# Patient Record
Sex: FEMALE | Race: WHITE | NOT HISPANIC OR LATINO | Employment: UNEMPLOYED | URBAN - METROPOLITAN AREA
[De-identification: names, ages, dates, MRNs, and addresses within clinical notes are randomized per-mention and may not be internally consistent; named-entity substitution may affect disease eponyms.]

---

## 2024-07-13 ENCOUNTER — HOSPITAL ENCOUNTER (EMERGENCY)
Facility: HOSPITAL | Age: 54
Discharge: HOME | End: 2024-07-14
Attending: INTERNAL MEDICINE
Payer: MEDICAID

## 2024-07-13 DIAGNOSIS — E86.0 DEHYDRATION: ICD-10-CM

## 2024-07-13 DIAGNOSIS — F14.10 COCAINE ABUSE: ICD-10-CM

## 2024-07-13 DIAGNOSIS — K59.00 CONSTIPATION, UNSPECIFIED CONSTIPATION TYPE: Primary | ICD-10-CM

## 2024-07-13 DIAGNOSIS — F12.980: ICD-10-CM

## 2024-07-13 LAB
BACTERIA #/AREA URNS AUTO: ABNORMAL /HPF
BASOPHILS # BLD AUTO: 0.05 X10(3)/MCL
BASOPHILS NFR BLD AUTO: 0.5 %
BILIRUB UR QL STRIP.AUTO: ABNORMAL
CLARITY UR: CLEAR
COLOR UR AUTO: YELLOW
EOSINOPHIL # BLD AUTO: 0.19 X10(3)/MCL (ref 0–0.9)
EOSINOPHIL NFR BLD AUTO: 1.8 %
ERYTHROCYTE [DISTWIDTH] IN BLOOD BY AUTOMATED COUNT: 18.6 % (ref 11.5–17)
GLUCOSE UR QL STRIP: NEGATIVE
HCT VFR BLD AUTO: 50 % (ref 37–47)
HGB BLD-MCNC: 16.6 G/DL (ref 12–16)
HGB UR QL STRIP: NEGATIVE
IMM GRANULOCYTES # BLD AUTO: 0.03 X10(3)/MCL (ref 0–0.04)
IMM GRANULOCYTES NFR BLD AUTO: 0.3 %
KETONES UR QL STRIP: ABNORMAL
LEUKOCYTE ESTERASE UR QL STRIP: NEGATIVE
LYMPHOCYTES # BLD AUTO: 2.45 X10(3)/MCL (ref 0.6–4.6)
LYMPHOCYTES NFR BLD AUTO: 23.4 %
MCH RBC QN AUTO: 25.6 PG (ref 27–31)
MCHC RBC AUTO-ENTMCNC: 33.2 G/DL (ref 33–36)
MCV RBC AUTO: 77.2 FL (ref 80–94)
MONOCYTES # BLD AUTO: 1.09 X10(3)/MCL (ref 0.1–1.3)
MONOCYTES NFR BLD AUTO: 10.4 %
NEUTROPHILS # BLD AUTO: 6.66 X10(3)/MCL (ref 2.1–9.2)
NEUTROPHILS NFR BLD AUTO: 63.6 %
NITRITE UR QL STRIP: NEGATIVE
PH UR STRIP: 6 [PH]
PLATELET # BLD AUTO: 314 X10(3)/MCL (ref 130–400)
PMV BLD AUTO: 11 FL (ref 7.4–10.4)
PROT UR QL STRIP: ABNORMAL
RBC # BLD AUTO: 6.48 X10(6)/MCL (ref 4.2–5.4)
RBC #/AREA URNS AUTO: ABNORMAL /HPF
SP GR UR STRIP.AUTO: 1.02 (ref 1–1.03)
SQUAMOUS #/AREA URNS AUTO: ABNORMAL /HPF
UROBILINOGEN UR STRIP-ACNC: 0.2
WBC # BLD AUTO: 10.47 X10(3)/MCL (ref 4.5–11.5)
WBC #/AREA URNS AUTO: ABNORMAL /HPF

## 2024-07-13 PROCEDURE — 81003 URINALYSIS AUTO W/O SCOPE: CPT | Performed by: INTERNAL MEDICINE

## 2024-07-13 PROCEDURE — 80307 DRUG TEST PRSMV CHEM ANLYZR: CPT | Performed by: INTERNAL MEDICINE

## 2024-07-13 PROCEDURE — 82077 ASSAY SPEC XCP UR&BREATH IA: CPT | Performed by: INTERNAL MEDICINE

## 2024-07-13 PROCEDURE — 85025 COMPLETE CBC W/AUTO DIFF WBC: CPT | Performed by: INTERNAL MEDICINE

## 2024-07-13 PROCEDURE — 83605 ASSAY OF LACTIC ACID: CPT | Performed by: INTERNAL MEDICINE

## 2024-07-13 PROCEDURE — 96361 HYDRATE IV INFUSION ADD-ON: CPT

## 2024-07-13 PROCEDURE — 99284 EMERGENCY DEPT VISIT MOD MDM: CPT | Mod: 25

## 2024-07-13 PROCEDURE — 80053 COMPREHEN METABOLIC PANEL: CPT | Performed by: INTERNAL MEDICINE

## 2024-07-13 PROCEDURE — 25000003 PHARM REV CODE 250: Performed by: INTERNAL MEDICINE

## 2024-07-13 PROCEDURE — 83690 ASSAY OF LIPASE: CPT | Performed by: INTERNAL MEDICINE

## 2024-07-13 PROCEDURE — 82150 ASSAY OF AMYLASE: CPT | Performed by: INTERNAL MEDICINE

## 2024-07-13 PROCEDURE — 96360 HYDRATION IV INFUSION INIT: CPT

## 2024-07-13 PROCEDURE — 81001 URINALYSIS AUTO W/SCOPE: CPT | Mod: XB | Performed by: INTERNAL MEDICINE

## 2024-07-13 RX ORDER — SODIUM CHLORIDE 9 MG/ML
125 INJECTION, SOLUTION INTRAVENOUS ONCE
Status: COMPLETED | OUTPATIENT
Start: 2024-07-13 | End: 2024-07-14

## 2024-07-13 RX ADMIN — SODIUM CHLORIDE 125 ML/HR: 9 INJECTION, SOLUTION INTRAVENOUS at 11:07

## 2024-07-13 RX ADMIN — SODIUM CHLORIDE 1000 ML: 9 INJECTION, SOLUTION INTRAVENOUS at 11:07

## 2024-07-14 VITALS
DIASTOLIC BLOOD PRESSURE: 78 MMHG | TEMPERATURE: 98 F | RESPIRATION RATE: 20 BRPM | BODY MASS INDEX: 24.76 KG/M2 | HEIGHT: 59 IN | WEIGHT: 122.81 LBS | OXYGEN SATURATION: 98 % | HEART RATE: 77 BPM | SYSTOLIC BLOOD PRESSURE: 129 MMHG

## 2024-07-14 LAB
ALBUMIN SERPL-MCNC: 4 G/DL (ref 3.5–5)
ALBUMIN/GLOB SERPL: 1 RATIO (ref 1.1–2)
ALP SERPL-CCNC: 73 UNIT/L (ref 40–150)
ALT SERPL-CCNC: 20 UNIT/L (ref 0–55)
AMPHET UR QL SCN: NEGATIVE
AMYLASE SERPL-CCNC: 70 UNIT/L (ref 25–125)
ANION GAP SERPL CALC-SCNC: 12 MEQ/L
AST SERPL-CCNC: 21 UNIT/L (ref 5–34)
BARBITURATE SCN PRESENT UR: NEGATIVE
BENZODIAZ UR QL SCN: NEGATIVE
BILIRUB SERPL-MCNC: 0.6 MG/DL
BUN SERPL-MCNC: 31 MG/DL (ref 9.8–20.1)
CALCIUM SERPL-MCNC: 10.5 MG/DL (ref 8.4–10.2)
CANNABINOIDS UR QL SCN: NEGATIVE
CHLORIDE SERPL-SCNC: 105 MMOL/L (ref 98–107)
CO2 SERPL-SCNC: 19 MMOL/L (ref 22–29)
COCAINE UR QL SCN: POSITIVE
CREAT SERPL-MCNC: 1.51 MG/DL (ref 0.55–1.02)
CREAT/UREA NIT SERPL: 21
ETHANOL SERPL-MCNC: <10 MG/DL
FENTANYL UR QL SCN: NEGATIVE
GFR SERPLBLD CREATININE-BSD FMLA CKD-EPI: 41 ML/MIN/1.73/M2
GLOBULIN SER-MCNC: 4 GM/DL (ref 2.4–3.5)
GLUCOSE SERPL-MCNC: 98 MG/DL (ref 74–100)
LACTATE SERPL-SCNC: 2 MMOL/L (ref 0.5–2.2)
LIPASE SERPL-CCNC: 34 U/L
MDMA UR QL SCN: NEGATIVE
OPIATES UR QL SCN: NEGATIVE
PCP UR QL: NEGATIVE
PH UR: 6 [PH] (ref 3–11)
POTASSIUM SERPL-SCNC: 3.7 MMOL/L (ref 3.5–5.1)
PROT SERPL-MCNC: 8 GM/DL (ref 6.4–8.3)
SODIUM SERPL-SCNC: 136 MMOL/L (ref 136–145)
SPECIFIC GRAVITY, URINE AUTO (.000) (OHS): 1.02 (ref 1–1.03)

## 2024-07-14 PROCEDURE — 25000003 PHARM REV CODE 250: Performed by: INTERNAL MEDICINE

## 2024-07-14 RX ORDER — LACTULOSE 10 G/15ML
20 SOLUTION ORAL
Status: COMPLETED | OUTPATIENT
Start: 2024-07-14 | End: 2024-07-14

## 2024-07-14 RX ADMIN — LACTULOSE 20 G: 20 SOLUTION ORAL at 12:07

## 2024-07-14 NOTE — ED PROVIDER NOTES
Encounter Date: 7/13/2024       History     Chief Complaint   Patient presents with    Abdominal Pain     Pt c/o abd pain, nausea and vomiting for years but worsening x 2 weeks.     53-year-old female presents to the emergency room in [place] after driving from [place] with severe abdominal pain nausea that has been going on for years but got worse over the past 2 weeks.  Patient just moved back from the [place] area and the mother reports that the patient has a twin sister that is recently diagnosed with colon cancer because this abdominal pain they were nervous and wanted to get checked out      Review of patient's allergies indicates:  No Known Allergies  History reviewed. No pertinent past medical history.  No past surgical history on file.  No family history on file.     Review of Systems   Constitutional: Negative.  Negative for activity change, appetite change, chills, diaphoresis, fatigue, fever and unexpected weight change.   HENT: Negative.  Negative for congestion, dental problem, drooling, ear discharge, ear pain, facial swelling, hearing loss, mouth sores, nosebleeds, postnasal drip, rhinorrhea, sinus pressure, sinus pain, sneezing, sore throat, tinnitus, trouble swallowing and voice change.    Eyes: Negative.  Negative for photophobia, pain, discharge, redness, itching and visual disturbance.   Respiratory: Negative.  Negative for apnea, cough, choking, chest tightness, shortness of breath, wheezing and stridor.    Cardiovascular: Negative.  Negative for chest pain, palpitations and leg swelling.   Gastrointestinal:  Positive for abdominal pain and constipation. Negative for abdominal distention, anal bleeding, blood in stool, diarrhea, nausea, rectal pain and vomiting.   Endocrine: Negative.  Negative for cold intolerance, heat intolerance, polydipsia, polyphagia and polyuria.   Genitourinary: Negative.  Negative for decreased urine volume, difficulty urinating, dyspareunia, dysuria, enuresis, flank  pain, frequency, genital sores, hematuria, menstrual problem, pelvic pain, urgency, vaginal bleeding, vaginal discharge and vaginal pain.   Musculoskeletal: Negative.  Negative for arthralgias, back pain, gait problem, joint swelling, myalgias, neck pain and neck stiffness.   Skin: Negative.  Negative for color change, pallor, rash and wound.   Allergic/Immunologic: Negative.  Negative for environmental allergies, food allergies and immunocompromised state.   Neurological: Negative.  Negative for dizziness, tremors, seizures, syncope, facial asymmetry, speech difficulty, weakness, light-headedness, numbness and headaches.   Hematological: Negative.  Negative for adenopathy. Does not bruise/bleed easily.   Psychiatric/Behavioral:  Negative for agitation, behavioral problems, confusion, decreased concentration, dysphoric mood, hallucinations, self-injury, sleep disturbance and suicidal ideas. The patient is nervous/anxious. The patient is not hyperactive.    All other systems reviewed and are negative.      Physical Exam     Initial Vitals [07/13/24 2327]   BP Pulse Resp Temp SpO2   109/68 97 18 98.2 °F (36.8 °C) 99 %      MAP       --         Physical Exam    Nursing note and vitals reviewed.  Constitutional: She appears well-developed and well-nourished.   HENT:   Head: Atraumatic.   Eyes: EOM are normal.   Neck: Neck supple.   Normal range of motion.  Cardiovascular:  Normal rate.           Pulmonary/Chest: Breath sounds normal.   Abdominal: Abdomen is soft. Bowel sounds are normal.   Musculoskeletal:         General: Normal range of motion.      Cervical back: Normal range of motion and neck supple.     Neurological: She is alert.   Skin: Skin is warm. Capillary refill takes less than 2 seconds.   Psychiatric: Her mood appears anxious. Her speech is rapid and/or pressured. She is hyperactive. Cognition and memory are normal. She expresses impulsivity. She is inattentive.         ED Course   Procedures  Admission  on 07/13/2024   Component Date Value Ref Range Status    Sodium 07/13/2024 136  136 - 145 mmol/L Final    Potassium 07/13/2024 3.7  3.5 - 5.1 mmol/L Final    Chloride 07/13/2024 105  98 - 107 mmol/L Final    CO2 07/13/2024 19 (L)  22 - 29 mmol/L Final    Glucose 07/13/2024 98  74 - 100 mg/dL Final    Blood Urea Nitrogen 07/13/2024 31.0 (H)  9.8 - 20.1 mg/dL Final    Creatinine 07/13/2024 1.51 (H)  0.55 - 1.02 mg/dL Final    Calcium 07/13/2024 10.5 (H)  8.4 - 10.2 mg/dL Final    Protein Total 07/13/2024 8.0  6.4 - 8.3 gm/dL Final    Albumin 07/13/2024 4.0  3.5 - 5.0 g/dL Final    Globulin 07/13/2024 4.0 (H)  2.4 - 3.5 gm/dL Final    Albumin/Globulin Ratio 07/13/2024 1.0 (L)  1.1 - 2.0 ratio Final    Bilirubin Total 07/13/2024 0.6  <=1.5 mg/dL Final    ALP 07/13/2024 73  40 - 150 unit/L Final    ALT 07/13/2024 20  0 - 55 unit/L Final    AST 07/13/2024 21  5 - 34 unit/L Final    eGFR 07/13/2024 41  mL/min/1.73/m2 Final    Anion Gap 07/13/2024 12.0  mEq/L Final    BUN/Creatinine Ratio 07/13/2024 21   Final    Color, UA 07/13/2024 Yellow  Yellow, Light-Yellow, Dark Yellow, [place], Straw Final    Appearance, UA 07/13/2024 Clear  Clear Final    Specific Gravity, UA 07/13/2024 1.025  1.005 - 1.030 Final    pH, UA 07/13/2024 6.0  5.0 - 8.5 Final    Protein, UA 07/13/2024 1+ (A)  Negative Final    Glucose, UA 07/13/2024 Negative  Negative, Normal Final    Ketones, UA 07/13/2024 Trace (A)  Negative Final    Blood, UA 07/13/2024 Negative  Negative Final    Bilirubin, UA 07/13/2024 1+ (A)  Negative Final    Urobilinogen, UA 07/13/2024 0.2  0.2, 1.0, Normal Final    Nitrites, UA 07/13/2024 Negative  Negative Final    Leukocyte Esterase, UA 07/13/2024 Negative  Negative Final    Amphetamines, Urine 07/13/2024 Negative  Negative Final    Barbiturates, Urine 07/13/2024 Negative  Negative Final    Benzodiazepine, Urine 07/13/2024 Negative  Negative Final    Cannabinoids, Urine 07/13/2024 Negative  Negative Final    Cocaine, Urine  07/13/2024 Positive (A)  Negative Final    Fentanyl, Urine 07/13/2024 Negative  Negative Final    MDMA, Urine 07/13/2024 Negative  Negative Final    Opiates, Urine 07/13/2024 Negative  Negative Final    Phencyclidine, Urine 07/13/2024 Negative  Negative Final    pH, Urine 07/13/2024 6.0  3.0 - 11.0 Final    Specific Gravity, Urine Auto 07/13/2024 1.025  1.001 - 1.035 Final    Ethanol Level 07/13/2024 <10.0  <=10.0 mg/dL Final    Lactic Acid Level 07/13/2024 2.0  0.5 - 2.2 mmol/L Final    Amylase Level 07/13/2024 70  25 - 125 unit/L Final    Lipase Level 07/13/2024 34  <=60 U/L Final    WBC 07/13/2024 10.47  4.50 - 11.50 x10(3)/mcL Final    RBC 07/13/2024 6.48 (H)  4.20 - 5.40 x10(6)/mcL Final    Hgb 07/13/2024 16.6 (H)  12.0 - 16.0 g/dL Final    Hct 07/13/2024 50.0 (H)  37.0 - 47.0 % Final    MCV 07/13/2024 77.2 (L)  80.0 - 94.0 fL Final    MCH 07/13/2024 25.6 (L)  27.0 - 31.0 pg Final    MCHC 07/13/2024 33.2  33.0 - 36.0 g/dL Final    RDW 07/13/2024 18.6 (H)  11.5 - 17.0 % Final    Platelet 07/13/2024 314  130 - 400 x10(3)/mcL Final    MPV 07/13/2024 11.0 (H)  7.4 - 10.4 fL Final    Neut % 07/13/2024 63.6  % Final    Lymph % 07/13/2024 23.4  % Final    Mono % 07/13/2024 10.4  % Final    Eos % 07/13/2024 1.8  % Final    Basophil % 07/13/2024 0.5  % Final    Lymph # 07/13/2024 2.45  0.6 - 4.6 x10(3)/mcL Final    Neut # 07/13/2024 6.66  2.1 - 9.2 x10(3)/mcL Final    Mono # 07/13/2024 1.09  0.1 - 1.3 x10(3)/mcL Final    Eos # 07/13/2024 0.19  0 - 0.9 x10(3)/mcL Final    Baso # 07/13/2024 0.05  <=0.2 x10(3)/mcL Final    IG# 07/13/2024 0.03  0 - 0.04 x10(3)/mcL Final    IG% 07/13/2024 0.3  % Final    Bacteria, UA 07/13/2024 None Seen  None Seen, Rare, Occasional /HPF Final    RBC, UA 07/13/2024 None Seen  None Seen, 0-2, 3-5, 0-5 /HPF Final    WBC, UA 07/13/2024 0-2  None Seen, 0-2, 3-5, 0-5 /HPF Final    Squamous Epithelial Cells, UA 07/13/2024 Few (A)  None Seen, Rare, Occasional, Occ /HPF Final       Labs Reviewed    COMPREHENSIVE METABOLIC PANEL - Abnormal; Notable for the following components:       Result Value    CO2 19 (*)     Blood Urea Nitrogen 31.0 (*)     Creatinine 1.51 (*)     Calcium 10.5 (*)     Globulin 4.0 (*)     Albumin/Globulin Ratio 1.0 (*)     All other components within normal limits   URINALYSIS, REFLEX TO URINE CULTURE - Abnormal; Notable for the following components:    Protein, UA 1+ (*)     Ketones, UA Trace (*)     Bilirubin, UA 1+ (*)     All other components within normal limits   DRUG SCREEN, URINE (BEAKER) - Abnormal; Notable for the following components:    Cocaine, Urine Positive (*)     All other components within normal limits    Narrative:     Cut off concentrations:    Amphetamines - 1000 ng/ml  Barbiturates - 200 ng/ml  Benzodiazepine - 200 ng/ml  Cannabinoids (THC) - 50 ng/ml  Cocaine - 300 ng/ml  Fentanyl - 1.0 ng/ml  MDMA - 500 ng/ml  Opiates - 300 ng/ml   Phencyclidine (PCP) - 25 ng/ml    Specimen submitted for drug analysis and tested for pH and specific gravity in order to evaluate sample integrity. Suspect tampering if specific gravity is <1.003 and/or pH is not within the range of 4.5 - 8.0  False negatives may result form substances such as bleach added to urine.  False positives may result for the presence of a substance with similar chemical structure to the drug or its metabolite.    This test provides only a PRELIMINARY analytical test result. A more specific alternate chemical method must be used in order to obtain a confirmed analytical result. Gas chromatography/mass spectrometry (GC/MS) is the preferred confirmatory method. Other chemical confirmation methods are available. Clinical consideration and professional judgement should be applied to any drug of abuse test result, particularly when preliminary positive results are used.    Positive results will be confirmed only at the physicians request. Unconfirmed screening results are to be used only for medical purposes  (treatment).        CBC WITH DIFFERENTIAL - Abnormal; Notable for the following components:    RBC 6.48 (*)     Hgb 16.6 (*)     Hct 50.0 (*)     MCV 77.2 (*)     MCH 25.6 (*)     RDW 18.6 (*)     MPV 11.0 (*)     All other components within normal limits   URINALYSIS, MICROSCOPIC - Abnormal; Notable for the following components:    Squamous Epithelial Cells, UA Few (*)     All other components within normal limits   ALCOHOL,MEDICAL (ETHANOL) - Normal   LACTIC ACID, PLASMA - Normal   AMYLASE - Normal   LIPASE - Normal   CBC W/ AUTO DIFFERENTIAL    Narrative:     The following orders were created for panel order CBC auto differential.  Procedure                               Abnormality         Status                     ---------                               -----------         ------                     CBC with Differential[[id number]]       Abnormal            Final result                 Please view results for these tests on the individual orders.          Imaging Results              X-Ray Abdomen Flat And Erect (Preliminary result)  Result time 07/14/24 00:17:09      Wet Read by Devin Fox MD (07/14/24 00:17:09, [facility] - Emergency Dept, Emergency Medicine)    Nonspecific bowel gas pattern with no evidence of obstruction or perforation, stool noted throughout the colon consistent with constipation                                     Medications   sodium chloride 0.9% bolus 1,000 mL 1,000 mL (1,000 mLs Intravenous New Bag 7/13/24 0840)   lactulose 20 gram/30 mL solution Soln 20 g (has no administration in time range)   0.9%  NaCl infusion (125 mL/hr Intravenous New Bag 7/13/24 3721)     Medical Decision Making  53-year-old white female presents with generalized abdominal pain nausea and vomiting.  Differential diagnosis includes gastritis, pancreatitis, appendicitis, cholecystitis, diverticulitis, constipation, perforated bowel, ischemic bowel, intussusception, somatic complaints.   Workup included blood work urine and an x-ray of her abdomen flat and upright.  Exam is benign except patient was very hyperactive and inattentive and appears to be intoxicated on something and went to drug screen came back positive for cocaine I asked her about and she claims that she has not done cocaine and greater than 2 weeks but she did smoke some synthetic at which time I explained to her that the synthetic would not make her cocaine test positive and she became very agitated.  The mother's in the room with the and reported that the twin sister has colon cancer and that is what made them worried.  Workup shows a mild degree of dehydration she was given some IV fluids an x-ray shows a significant stool burden throughout the colon consistent with moderate constipation.  Her white counts normal lactic acid is normal appears to be no infection in her urine so will give a dose of lactulose and discussed with the mother that if she does not have a large relieving bowel movement by tomorrow to get her some MiraLax and cleaned her bowel.  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 Decision-making details documented in ED Course.  Radiology: ordered and independent interpretation performed. Decision-making details documented in ED Course.    Risk  OTC drugs.  Prescription drug management.  Diagnosis or treatment significantly limited by social determinants of health.                                      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